# Patient Record
Sex: MALE
[De-identification: names, ages, dates, MRNs, and addresses within clinical notes are randomized per-mention and may not be internally consistent; named-entity substitution may affect disease eponyms.]

---

## 2024-02-15 PROBLEM — Z00.00 ENCOUNTER FOR PREVENTIVE HEALTH EXAMINATION: Status: ACTIVE | Noted: 2024-02-15

## 2024-02-27 ENCOUNTER — TRANSCRIPTION ENCOUNTER (OUTPATIENT)
Age: 74
End: 2024-02-27

## 2024-02-27 ENCOUNTER — APPOINTMENT (OUTPATIENT)
Dept: RADIATION ONCOLOGY | Facility: CLINIC | Age: 74
End: 2024-02-27
Payer: COMMERCIAL

## 2024-02-27 ENCOUNTER — NON-APPOINTMENT (OUTPATIENT)
Age: 74
End: 2024-02-27

## 2024-02-27 DIAGNOSIS — B20 HUMAN IMMUNODEFICIENCY VIRUS [HIV] DISEASE: ICD-10-CM

## 2024-02-27 DIAGNOSIS — Z78.9 OTHER SPECIFIED HEALTH STATUS: ICD-10-CM

## 2024-02-27 PROCEDURE — 99204 OFFICE O/P NEW MOD 45 MIN: CPT

## 2024-02-27 RX ORDER — EMTRICITABINE AND TENOFOVIR DISOPROXIL FUMARATE 167; 250 MG/1; MG/1
TABLET, FILM COATED ORAL
Refills: 0 | Status: ACTIVE | COMMUNITY

## 2024-02-28 PROBLEM — Z78.9 NO FAMILY HISTORY OF CANCER: Status: ACTIVE | Noted: 2024-02-28

## 2024-02-28 NOTE — HISTORY OF PRESENT ILLNESS
[FreeTextEntry1] : Mr. Johnny Tovar is a 73 y.o male with PMHx of HIV, dx with prostate adenocarcinoma self-referred for consideration of radiation therapy. He initially presented with elevated PSA 6.6ng/ml, had been trending up over the past year. Prostate biopsy 1/25/2024 with Dr. Hernandez at [facility] revealed multiple cores involved by adenocarcinoma ranging from 7 (3+4) , grade group 2 and (Cody score 6 (3+3) grade group 1, and Cody grade 7 (3+4),GG2 with perineural invasion identified, multifocal (as described below).  2/27/24 Consult - Today patient states he is feeling well. He denies dysuria, blood in urine, frequency, nocturia. He lives in [place] and is currently working as a .   Prior RT - no Prior Chemo - no Pacemaker - no   History of Present Illness:  MRI of the prostate  -4 x 3.4 x 4cm; volume 28ml; psa density 0.21ng/ml -normal sized prostate gland -Lesion 1: 0.5cm lef midgland peripheral zone lesion. PI-RADS 4, HIGH -No extra prostatic extension. No seminal vesicle invasion -No pelvic lymphadenopathy  -No suspicious bone lesion   Biopsy 1/25/24 -Left peripheral zone posteromedial: Prostatic adenocarcinoma [place] grade 7 (3+4), GG2, involving 2 of 2 cores measuring 17.9mm occupying 77% of tissue. Perineural invasion identified.  -Left peripheral zone posterolateral: Prostatic adenocarcinoma Cody grade 6 (3+3), GG1, 1 of 2 cores measuring 0.8mm, occupying 3% tissue  -Right peripheral zone posteromedial: Prostatic adenocarcinoma [place] grade 7 (3+4),GG2, 1 of 2 cores measuring 7.9mm, occupying 43% tissue  -Right peripheral zone posterolateral: Prostatic adenocarcinoma Cody grade 6 (3+3), GG1, 1 of 2 cores measuring 0.75mm, occupying 3% tissue  -Left base MRI lesion (A): Prostatic adenocarcinoma [place] grade 6 (3+3), GG1, 1 core measuring 4.15mm occupying 29% tissue -Left base MRI lesion (B): Prostatic adenocarcinoma Cody grade 7 (3+4),GG2, 1 core measuirng 1.4mm occupying 12% of tissue. Perineal invasion identified.  --Left base MRI lesion (C): Prostatic adenocarcinoma [place] grade 7 (3+4),GG2, 1 core measuring 3.96mm occupying 30% tissue. Perineural invasion identified, multifocal.  Psa trend; ng/ml 6.6ng/ml

## 2024-02-28 NOTE — REVIEW OF SYSTEMS
[Negative] : Psychiatric [Hematuria: Grade 0] : Hematuria: Grade 0 [Urinary Incontinence: Grade 0] : Urinary Incontinence: Grade 0  [Urinary Retention: Grade 0] : Urinary Retention: Grade 0 [Urinary Tract Pain: Grade 0] : Urinary Tract Pain: Grade 0 [Urinary Urgency: Grade 0] : Urinary Urgency: Grade 0 [Ejaculation Disorder: Grade 0] : Ejaculation Disorder: Grade 0 [Urinary Frequency: Grade 0] : Urinary Frequency: Grade 0 [Erectile Dysfunction: Grade 1 - Decrease in erectile function (frequency or rigidity of erections) but intervention not indicated (e.g., medication or use of mechanical device, penile pump)] : Erectile Dysfunction: Grade 1 - Decrease in erectile function (frequency or rigidity of erections) but intervention not indicated (e.g., medication or use of mechanical device, penile pump) [Cognitive Disturbance: Grade 0] : Cognitive Disturbance: Grade 0 [Dizziness: Grade 0] : Dizziness: Grade 0  [Concentration Impairment: Grade 0] : Concentration Impairment: Grade 0 [Lethargy: Grade 0] : Lethargy: Grade 0 [Facial Muscle Weakness: Grade 0] : Facial Muscle Weakness: Grade 0 [Headache: Grade 0] : Headache: Grade 0 [Cough: Grade 0] : Cough: Grade 0 [Dyspnea: Grade 0] : Dyspnea: Grade 0 [Hiccups: Grade 0] : Hiccups: Grade 0 [Hoarseness: Grade 0] : Hoarseness: Grade 0 [Hypoxia: Grade 0] : Hypoxia: Grade 0